# Patient Record
Sex: MALE | Race: BLACK OR AFRICAN AMERICAN | NOT HISPANIC OR LATINO | Employment: STUDENT
[De-identification: names, ages, dates, MRNs, and addresses within clinical notes are randomized per-mention and may not be internally consistent; named-entity substitution may affect disease eponyms.]

---
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
## 2024-10-22 ENCOUNTER — HOSPITAL ENCOUNTER (EMERGENCY)
Facility: CLINIC | Age: 19
Discharge: HOME | End: 2024-10-22
Attending: EMERGENCY MEDICINE | Admitting: EMERGENCY MEDICINE
Payer: COMMERCIAL

## 2024-10-22 VITALS
BODY MASS INDEX: 38.74 KG/M2 | OXYGEN SATURATION: 97 % | SYSTOLIC BLOOD PRESSURE: 136 MMHG | HEIGHT: 72 IN | RESPIRATION RATE: 18 BRPM | DIASTOLIC BLOOD PRESSURE: 77 MMHG | HEART RATE: 112 BPM | TEMPERATURE: 98.2 F | WEIGHT: 286 LBS

## 2024-10-22 DIAGNOSIS — R51.9 ACUTE NONINTRACTABLE HEADACHE, UNSPECIFIED HEADACHE TYPE: ICD-10-CM

## 2024-10-22 DIAGNOSIS — R50.9 FEVER IN ADULT: ICD-10-CM

## 2024-10-22 LAB
ANION GAP SERPL CALCULATED.3IONS-SCNC: 12 MMOL/L (ref 7–15)
ATRIAL RATE - MUSE: 115 BPM
BASOPHILS # BLD AUTO: 0 10E3/UL (ref 0–0.2)
BASOPHILS NFR BLD AUTO: 1 %
BUN SERPL-MCNC: 9.3 MG/DL (ref 6–20)
CALCIUM SERPL-MCNC: 9.2 MG/DL (ref 8.8–10.4)
CHLORIDE SERPL-SCNC: 104 MMOL/L (ref 98–107)
CREAT SERPL-MCNC: 0.95 MG/DL (ref 0.67–1.17)
DIASTOLIC BLOOD PRESSURE - MUSE: NORMAL MMHG
EGFRCR SERPLBLD CKD-EPI 2021: >90 ML/MIN/1.73M2
EOSINOPHIL # BLD AUTO: 0 10E3/UL (ref 0–0.7)
EOSINOPHIL NFR BLD AUTO: 0 %
ERYTHROCYTE [DISTWIDTH] IN BLOOD BY AUTOMATED COUNT: 13 % (ref 10–15)
FLUAV RNA SPEC QL NAA+PROBE: NEGATIVE
FLUBV RNA RESP QL NAA+PROBE: NEGATIVE
GLUCOSE SERPL-MCNC: 107 MG/DL (ref 70–99)
HCO3 SERPL-SCNC: 23 MMOL/L (ref 22–29)
HCT VFR BLD AUTO: 42.7 % (ref 40–53)
HGB BLD-MCNC: 14.6 G/DL (ref 13.3–17.7)
IMM GRANULOCYTES # BLD: 0.1 10E3/UL
IMM GRANULOCYTES NFR BLD: 1 %
INTERPRETATION ECG - MUSE: NORMAL
LYMPHOCYTES # BLD AUTO: 0.2 10E3/UL (ref 0.8–5.3)
LYMPHOCYTES NFR BLD AUTO: 4 %
MCH RBC QN AUTO: 27.5 PG (ref 26.5–33)
MCHC RBC AUTO-ENTMCNC: 34.2 G/DL (ref 31.5–36.5)
MCV RBC AUTO: 81 FL (ref 78–100)
MONOCYTES # BLD AUTO: 0.8 10E3/UL (ref 0–1.3)
MONOCYTES NFR BLD AUTO: 12 %
NEUTROPHILS # BLD AUTO: 5.3 10E3/UL (ref 1.6–8.3)
NEUTROPHILS NFR BLD AUTO: 83 %
NRBC # BLD AUTO: 0 10E3/UL
NRBC BLD AUTO-RTO: 0 /100
P AXIS - MUSE: 48 DEGREES
PLATELET # BLD AUTO: 265 10E3/UL (ref 150–450)
POTASSIUM SERPL-SCNC: 3.5 MMOL/L (ref 3.4–5.3)
PR INTERVAL - MUSE: 176 MS
QRS DURATION - MUSE: 92 MS
QT - MUSE: 302 MS
QTC - MUSE: 417 MS
R AXIS - MUSE: 56 DEGREES
RBC # BLD AUTO: 5.3 10E6/UL (ref 4.4–5.9)
RSV RNA SPEC NAA+PROBE: NEGATIVE
SARS-COV-2 RNA RESP QL NAA+PROBE: NEGATIVE
SODIUM SERPL-SCNC: 139 MMOL/L (ref 135–145)
SYSTOLIC BLOOD PRESSURE - MUSE: NORMAL MMHG
T AXIS - MUSE: 28 DEGREES
VENTRICULAR RATE- MUSE: 115 BPM
WBC # BLD AUTO: 6.4 10E3/UL (ref 4–11)

## 2024-10-22 PROCEDURE — 93005 ELECTROCARDIOGRAM TRACING: CPT

## 2024-10-22 PROCEDURE — 250N000013 HC RX MED GY IP 250 OP 250 PS 637: Performed by: EMERGENCY MEDICINE

## 2024-10-22 PROCEDURE — 36415 COLL VENOUS BLD VENIPUNCTURE: CPT | Performed by: EMERGENCY MEDICINE

## 2024-10-22 PROCEDURE — 85004 AUTOMATED DIFF WBC COUNT: CPT | Performed by: EMERGENCY MEDICINE

## 2024-10-22 PROCEDURE — 99284 EMERGENCY DEPT VISIT MOD MDM: CPT | Mod: 25

## 2024-10-22 PROCEDURE — 96360 HYDRATION IV INFUSION INIT: CPT

## 2024-10-22 PROCEDURE — 80048 BASIC METABOLIC PNL TOTAL CA: CPT | Performed by: EMERGENCY MEDICINE

## 2024-10-22 PROCEDURE — 87637 SARSCOV2&INF A&B&RSV AMP PRB: CPT | Performed by: EMERGENCY MEDICINE

## 2024-10-22 PROCEDURE — 258N000003 HC RX IP 258 OP 636: Performed by: EMERGENCY MEDICINE

## 2024-10-22 RX ORDER — ACETAMINOPHEN 500 MG
1000 TABLET ORAL ONCE
Status: COMPLETED | OUTPATIENT
Start: 2024-10-22 | End: 2024-10-22

## 2024-10-22 RX ORDER — IBUPROFEN 200 MG
400 TABLET ORAL ONCE
Status: COMPLETED | OUTPATIENT
Start: 2024-10-22 | End: 2024-10-22

## 2024-10-22 RX ORDER — ACETAMINOPHEN 500 MG
500-1000 TABLET ORAL EVERY 6 HOURS PRN
Qty: 20 TABLET | Refills: 0 | Status: SHIPPED | OUTPATIENT
Start: 2024-10-22

## 2024-10-22 RX ORDER — IBUPROFEN 200 MG
400 TABLET ORAL EVERY 8 HOURS PRN
Qty: 30 TABLET | Refills: 0 | Status: SHIPPED | OUTPATIENT
Start: 2024-10-22

## 2024-10-22 RX ADMIN — IBUPROFEN 400 MG: 200 TABLET, FILM COATED ORAL at 18:52

## 2024-10-22 RX ADMIN — ACETAMINOPHEN 1000 MG: 500 TABLET ORAL at 18:52

## 2024-10-22 RX ADMIN — SODIUM CHLORIDE 1000 ML: 9 INJECTION, SOLUTION INTRAVENOUS at 21:14

## 2024-10-22 ASSESSMENT — ACTIVITIES OF DAILY LIVING (ADL)
ADLS_ACUITY_SCORE: 35

## 2024-10-22 NOTE — ED PROVIDER NOTES
Emergency Department Note      History of Present Illness     Chief Complaint   Headache and Fever      JAH Wetzel is a 19 year old male, otherwise healthy, who presents today with headache and fever.  Patient states that his symptoms started this morning.  He has a headache that encompasses his whole head, and he says is not severe but also not mild.  He described it as 6 out of 10 in urgent care.  He has developed a slight amount of soreness to his neck as well, but denies any stiffness.  He took some medication from a coworker at 10 AM, and then again at noon, but he cannot recall the name of the medication.  He was given 2 pills.  He states that it did not do much to help his headache.  He denies any numbness, weakness, vomiting, sore throat, cough, congestion, or earache.  No abdominal pain.  No known sick contacts, but he does work as a health aide, and wonders if he might of had a exposure there.  He has not noticed any rash.    Independent Historian   None    Review of External Notes   I reviewed notes from urgent care.  Patient developed headache this morning, neck was supple, no source of fever on exam.  Recommended to come to the ED.    Past Medical History     Medical History and Problem List   No past medical history on file.    Medications   No current outpatient medications on file.      Surgical History   No past surgical history on file.    Physical Exam     Patient Vitals for the past 24 hrs:   BP Temp Pulse Resp SpO2 Height Weight   10/22/24 1724 -- -- -- -- -- 1.829 m (6') 129.7 kg (286 lb)   10/22/24 1714 -- -- -- -- -- -- 129.3 kg (285 lb)   10/22/24 1712 111/52 (!) 102.2  F (39  C) (!) 136 18 95 % -- --     Physical Exam  General: alert, seated comfortably on a chair in fast-track.  He is nontoxic.  HENT: mucous membranes moist  Neck, supple, full range of motion without pain.  CV: tachycardic rate, regular rhythm  Resp: normal effort, clear throughout, no crackles or  wheezing  GI: abdomen soft and nontender, no guarding  MSK: no bony tenderness  Skin: appropriately warm and dry, no rash.  Extremities: no edema, calves non-tender  Neuro:     Speech is fluent, cognition is normal.     EOMI, symmetric grimace.     Str 5/5 in RUE, LUE, RLE, LLE.     Fine touch sensation intact in BUE/BLE.  Psych: normal mood and affect      Diagnostics     Lab Results   Labs Ordered and Resulted from Time of ED Arrival to Time of ED Departure   INFLUENZA A/B, RSV, & SARS-COV2 PCR - Normal       Result Value    Influenza A PCR Negative      Influenza B PCR Negative      RSV PCR Negative      SARS CoV2 PCR Negative         Imaging   No orders to display       EKG   ECG results from 10/22/24   EKG 12 lead     Value    Systolic Blood Pressure     Diastolic Blood Pressure     Ventricular Rate 115    Atrial Rate 115    FL Interval 176    QRS Duration 92        QTc 417    P Axis 48    R AXIS 56    T Axis 28    Interpretation ECG      Sinus tachycardia  Otherwise normal ECG  No previous ECGs available  Confirmed by GENERATED REPORT, COMPUTER ([id number]),  Kayden Drew ([id number]) on 10/22/2024 10:16:14 PM           Independent Interpretation   None    ED Course      Medications Administered   Medications   acetaminophen (TYLENOL) tablet 1,000 mg (1,000 mg Oral $Given 10/22/24 1852)   ibuprofen (ADVIL/MOTRIN) tablet 400 mg (400 mg Oral $Given 10/22/24 1852)   sodium chloride 0.9% BOLUS 1,000 mL (0 mLs Intravenous Stopped 10/22/24 2202)       Procedures   Procedures     Discussion of Management   None    ED Course   ED Course as of 10/23/24 0952   Tue Oct 22, 2024   1851 Patient provided with a meal.  Repeat check of heart rate is 117.  Rate is regular on the pulse ox.   2045 I rechecked the patient.  Headache is better, but he is still tachy to 124.  Room to main   2249 Tachycardia resolved, HR 99.  Patient is feeling much better.       Additional Documentation  None    Medical Decision Making /  Diagnosis     CMS Diagnoses: None    MIPS       None    MDM   Musxaf Yola Wetzel is a 19 year old male, otherwise healthy, who presents today with headache and fever.  On exam, the patient was febrile and tachycardic.  He is well appearing without signs of meningsismus.  Viral testing is negative.  Symptoms slightly improved following Tylenol and ibuprofen, but he remained tachycardic.  Additional labs and fluids were ordered.  Labs included WBC count were unremarkable.  On recheck, the patient is feeling normal after fluids, with complete resolution of headache.  Fever and tachycardia have resolved.  We discussed that symptoms are likely related to a viral infection, but LP is the only way to rule out bacterial meningitis.  I described my low clinical suspicion, and using shared decision making, we decided not to do additional testing today.  Return for persistent fever, worsening headache, or other concerns.    Disposition   The patient was discharged.     Diagnosis     ICD-10-CM    1. Fever in adult  R50.9       2. Acute nonintractable headache, unspecified headache type  R51.9            MD Ngoc Martines Tracy Lynn, MD  10/23/24 0954

## 2024-10-23 NOTE — ED NOTES
Bed: ED22  Expected date: 10/22/24  Expected time: 8:45 PM  Means of arrival:   Comments:  Triage 4